# Patient Record
Sex: MALE | Race: BLACK OR AFRICAN AMERICAN | NOT HISPANIC OR LATINO | Employment: OTHER | URBAN - METROPOLITAN AREA
[De-identification: names, ages, dates, MRNs, and addresses within clinical notes are randomized per-mention and may not be internally consistent; named-entity substitution may affect disease eponyms.]

---

## 2021-04-19 ENCOUNTER — OFFICE VISIT (OUTPATIENT)
Dept: UROLOGY | Facility: CLINIC | Age: 71
End: 2021-04-19
Payer: OTHER GOVERNMENT

## 2021-04-19 ENCOUNTER — LAB VISIT (OUTPATIENT)
Dept: LAB | Facility: HOSPITAL | Age: 71
End: 2021-04-19
Attending: UROLOGY
Payer: OTHER GOVERNMENT

## 2021-04-19 VITALS
HEIGHT: 73 IN | SYSTOLIC BLOOD PRESSURE: 111 MMHG | DIASTOLIC BLOOD PRESSURE: 69 MMHG | WEIGHT: 237 LBS | HEART RATE: 84 BPM | BODY MASS INDEX: 31.41 KG/M2

## 2021-04-19 DIAGNOSIS — N40.1 BPH WITH URINARY OBSTRUCTION: Primary | ICD-10-CM

## 2021-04-19 DIAGNOSIS — N13.8 BPH WITH URINARY OBSTRUCTION: Primary | ICD-10-CM

## 2021-04-19 DIAGNOSIS — N40.1 BPH WITH URINARY OBSTRUCTION: ICD-10-CM

## 2021-04-19 DIAGNOSIS — N13.8 BPH WITH URINARY OBSTRUCTION: ICD-10-CM

## 2021-04-19 LAB — COMPLEXED PSA SERPL-MCNC: 2.6 NG/ML (ref 0–4)

## 2021-04-19 PROCEDURE — 36415 COLL VENOUS BLD VENIPUNCTURE: CPT | Performed by: UROLOGY

## 2021-04-19 PROCEDURE — 99202 OFFICE O/P NEW SF 15 MIN: CPT | Mod: PBBFAC | Performed by: UROLOGY

## 2021-04-19 PROCEDURE — 99999 PR PBB SHADOW E&M-NEW PATIENT-LVL II: ICD-10-PCS | Mod: PBBFAC,,, | Performed by: UROLOGY

## 2021-04-19 PROCEDURE — 84153 ASSAY OF PSA TOTAL: CPT | Performed by: UROLOGY

## 2021-04-19 PROCEDURE — 99204 PR OFFICE/OUTPT VISIT, NEW, LEVL IV, 45-59 MIN: ICD-10-PCS | Mod: S$PBB,,, | Performed by: UROLOGY

## 2021-04-19 PROCEDURE — 99999 PR PBB SHADOW E&M-NEW PATIENT-LVL II: CPT | Mod: PBBFAC,,, | Performed by: UROLOGY

## 2021-04-19 PROCEDURE — 99204 OFFICE O/P NEW MOD 45 MIN: CPT | Mod: S$PBB,,, | Performed by: UROLOGY

## 2021-04-19 RX ORDER — TAMSULOSIN HYDROCHLORIDE 0.4 MG/1
0.4 CAPSULE ORAL DAILY
Status: ON HOLD | COMMUNITY
End: 2023-05-04

## 2021-04-19 RX ORDER — CILOSTAZOL 50 MG/1
50 TABLET ORAL 2 TIMES DAILY
COMMUNITY
End: 2023-06-13

## 2021-04-19 RX ORDER — NAPROXEN SODIUM 220 MG/1
81 TABLET, FILM COATED ORAL DAILY
COMMUNITY

## 2021-04-19 RX ORDER — HYDROCHLOROTHIAZIDE 50 MG/1
50 TABLET ORAL DAILY
COMMUNITY
End: 2023-04-06

## 2021-04-19 RX ORDER — FINASTERIDE 5 MG/1
5 TABLET, FILM COATED ORAL DAILY
COMMUNITY

## 2021-04-19 RX ORDER — METFORMIN HYDROCHLORIDE 1000 MG/1
TABLET ORAL
COMMUNITY

## 2021-04-19 RX ORDER — ATORVASTATIN CALCIUM 40 MG/1
40 TABLET, FILM COATED ORAL DAILY
COMMUNITY

## 2021-04-26 ENCOUNTER — TELEPHONE (OUTPATIENT)
Dept: UROLOGY | Facility: CLINIC | Age: 71
End: 2021-04-26

## 2021-05-11 ENCOUNTER — HOSPITAL ENCOUNTER (OUTPATIENT)
Dept: RADIOLOGY | Facility: HOSPITAL | Age: 71
Discharge: HOME | End: 2021-05-11
Attending: UROLOGY
Payer: OTHER GOVERNMENT

## 2021-05-11 DIAGNOSIS — N40.1 BPH WITH URINARY OBSTRUCTION: ICD-10-CM

## 2021-05-11 DIAGNOSIS — N13.8 BPH WITH URINARY OBSTRUCTION: ICD-10-CM

## 2021-05-11 PROCEDURE — 76770 US RETROPERITONEAL COMPLETE: ICD-10-PCS | Mod: 26,,, | Performed by: RADIOLOGY

## 2021-05-11 PROCEDURE — 76770 US EXAM ABDO BACK WALL COMP: CPT | Mod: TC

## 2021-05-11 PROCEDURE — 76770 US EXAM ABDO BACK WALL COMP: CPT | Mod: 26,,, | Performed by: RADIOLOGY

## 2021-05-18 ENCOUNTER — PROCEDURE VISIT (OUTPATIENT)
Dept: UROLOGY | Facility: CLINIC | Age: 71
End: 2021-05-18
Payer: OTHER GOVERNMENT

## 2021-05-18 VITALS
RESPIRATION RATE: 17 BRPM | HEIGHT: 74 IN | TEMPERATURE: 98 F | SYSTOLIC BLOOD PRESSURE: 162 MMHG | BODY MASS INDEX: 31.67 KG/M2 | DIASTOLIC BLOOD PRESSURE: 94 MMHG | HEART RATE: 86 BPM | WEIGHT: 246.81 LBS

## 2021-05-18 DIAGNOSIS — N13.8 BPH WITH URINARY OBSTRUCTION: ICD-10-CM

## 2021-05-18 DIAGNOSIS — N40.1 BPH WITH URINARY OBSTRUCTION: ICD-10-CM

## 2021-05-18 PROCEDURE — 52000 CYSTOSCOPY: ICD-10-PCS | Mod: S$PBB,,, | Performed by: UROLOGY

## 2021-05-18 PROCEDURE — 76872 TRANSRECTAL ULTRASOUND: ICD-10-PCS | Mod: 26,S$PBB,, | Performed by: UROLOGY

## 2021-05-18 PROCEDURE — 52000 CYSTOURETHROSCOPY: CPT | Mod: PBBFAC | Performed by: UROLOGY

## 2021-05-18 PROCEDURE — 76872 US TRANSRECTAL: CPT | Mod: PBBFAC | Performed by: UROLOGY

## 2021-05-18 RX ORDER — LIDOCAINE HYDROCHLORIDE 20 MG/ML
JELLY TOPICAL
Status: COMPLETED | OUTPATIENT
Start: 2021-05-18 | End: 2021-05-18

## 2021-05-18 RX ADMIN — LIDOCAINE HYDROCHLORIDE: 20 JELLY TOPICAL at 12:05

## 2021-11-02 PROBLEM — M54.50 LOW BACK PAIN: Status: ACTIVE | Noted: 2021-11-02

## 2023-01-10 ENCOUNTER — OFFICE VISIT (OUTPATIENT)
Dept: URGENT CARE | Facility: CLINIC | Age: 73
End: 2023-01-10
Payer: MEDICARE

## 2023-01-10 VITALS
RESPIRATION RATE: 20 BRPM | SYSTOLIC BLOOD PRESSURE: 140 MMHG | TEMPERATURE: 99 F | BODY MASS INDEX: 31.57 KG/M2 | DIASTOLIC BLOOD PRESSURE: 87 MMHG | HEART RATE: 65 BPM | HEIGHT: 74 IN | WEIGHT: 246 LBS | OXYGEN SATURATION: 96 %

## 2023-01-10 DIAGNOSIS — G89.29 CHRONIC BILATERAL LOW BACK PAIN WITHOUT SCIATICA: Primary | ICD-10-CM

## 2023-01-10 DIAGNOSIS — M54.50 CHRONIC BILATERAL LOW BACK PAIN WITHOUT SCIATICA: Primary | ICD-10-CM

## 2023-01-10 PROCEDURE — 72100 XR LUMBAR SPINE 2 OR 3 VIEWS: ICD-10-PCS | Mod: S$GLB,,, | Performed by: RADIOLOGY

## 2023-01-10 PROCEDURE — 99203 OFFICE O/P NEW LOW 30 MIN: CPT | Mod: S$GLB,,,

## 2023-01-10 PROCEDURE — 99203 PR OFFICE/OUTPT VISIT, NEW, LEVL III, 30-44 MIN: ICD-10-PCS | Mod: S$GLB,,,

## 2023-01-10 PROCEDURE — 72100 X-RAY EXAM L-S SPINE 2/3 VWS: CPT | Mod: S$GLB,,, | Performed by: RADIOLOGY

## 2023-01-10 NOTE — PROGRESS NOTES
"Subjective:       Patient ID: Terrence Hackett is a 72 y.o. male.    Vitals:  height is 6' 2" (1.88 m) and weight is 111.6 kg (246 lb). His temperature is 98.8 °F (37.1 °C). His blood pressure is 140/87 (abnormal) and his pulse is 65. His respiration is 20 and oxygen saturation is 96%.     Chief Complaint: Back Pain    This is a 72 y.o. male who presents today with a chief complaint of lower back pain. Pain has been constant for about 5 years now. Pt has been to physical therapy before in the past and have had  treatment at other facilities, and he says that it did not work. [facility]. He states hat back pain has progressively worsened in the past 5 years. Pt has done heating pads and other pain relievers but still no relief. Pain radiates to the back of leg at times. He has been taking aspirin for the pain with no relief. Patient rates pain as a 8/10. Pain worse with laying down and when he first gets up and walks around. When patient walks longer pain gets better. Sometimes pain radiates down both/one leg. Denies urinary or bowel incontinence. Denies tingling or numbness in the legs.       Back Pain  This is a chronic problem. The current episode started more than 1 year ago. The problem occurs constantly. The problem is unchanged. The quality of the pain is described as aching. The pain does not radiate. The pain is at a severity of 8/10. The pain is moderate. The symptoms are aggravated by lying down and standing. Pertinent negatives include no bladder incontinence, bowel incontinence, headaches, numbness or tingling. He has tried muscle relaxant, ice, walking, bed rest and heat for the symptoms. The treatment provided no relief.     Constitution: Negative for sweating and fatigue.   HENT:  Negative for congestion.    Neck: Negative for neck pain and neck stiffness.   Eyes:  Negative for eye pain and eye redness.   Gastrointestinal:  Negative for constipation, diarrhea " and bowel incontinence.   Genitourinary:  Negative for urine decreased and bladder incontinence.   Musculoskeletal:  Positive for pain and back pain. Negative for trauma, joint pain, joint swelling, abnormal ROM of joint and pain with walking.   Skin:  Negative for hives.   Allergic/Immunologic: Negative for hives and itching.   Neurological:  Negative for headaches, disorientation, altered mental status and numbness.   Psychiatric/Behavioral:  Negative for altered mental status and disorientation.      Objective:      Physical Exam   Constitutional: He is oriented to person, place, and time. He appears well-developed. He is cooperative. No distress.   HENT:   Head: Normocephalic and atraumatic.   Nose: Nose normal.   Mouth/Throat: Oropharynx is clear and moist and mucous membranes are normal.   Eyes: Conjunctivae and lids are normal.   Neck: Trachea normal and phonation normal. Neck supple.   Cardiovascular: Normal rate, regular rhythm, normal heart sounds and normal pulses.   Pulmonary/Chest: Effort normal and breath sounds normal.   Abdominal: Normal appearance and bowel sounds are normal. He exhibits no mass. Soft.   Musculoskeletal:         General: No deformity.      Thoracic back: Normal.      Lumbar back: He exhibits tenderness and bony tenderness. He exhibits normal range of motion, no swelling, no edema, no deformity, no laceration and no spasm.        Back:       Comments: Full ROM of the spine without pain. There is tenderness to palpation over L3-L4 with no bony step offs palpated. Negative straight leg raise bilaterally. Normal gait.    Neurological: He is alert and oriented to person, place, and time. He has normal strength and normal reflexes. No sensory deficit.   Skin: Skin is warm, dry, intact and not diaphoretic.   Psychiatric: His speech is normal and behavior is normal. Judgment and thought content normal.   Nursing note and vitals reviewed.    XR LUMBAR SPINE 2 OR 3 VIEWS    Result Date:  1/10/2023  EXAMINATION: XR LUMBAR SPINE 2 OR 3 VIEWS CLINICAL HISTORY: Low back pain, unspecified TECHNIQUE: Three views of the lumbar spine. COMPARISON: None FINDINGS: There is multilevel lumbar spondylosis with intervertebral disc space narrowing and endplate degenerative changes.  There is multilevel facet arthropathy resulting in grade 1 anterolisthesis of L4 on L5. no fracture or bone destruction.  Soft tissue calcifications in the left gluteal region.  Extensive aortic atherosclerosis.  Symmetric degenerative changes of the sacroiliac joints.     Multilevel lumbar spondylosis.  No acute osseous abnormalities. Electronically signed by: Gatito Pardo MD Date:    01/10/2023 Time:    09:21     Assessment:       1. Chronic bilateral low back pain without sciatica          Plan:         Chronic bilateral low back pain without sciatica  -     Ambulatory referral/consult to [facility]  -     XR LUMBAR SPINE 2 OR 3 VIEWS; Future; Expected date: 01/10/2023                 Patient Instructions   A referral for Back and Spine specialist has been placed. Call [phone] to schedule an appointment.     - Rest.    - Drink plenty of fluids.    - Acetaminophen (tylenol) or Ibuprofen (advil,motrin) as directed as needed for fever/pain. Avoid tylenol if you have a history of liver disease. Do not take ibuprofen if you have a history of GI bleeding, kidney disease, or if you take blood thinners.   - Ibuprofen dosing for adults: 400 mg by mouth every 4-6 hours as needed. Max: 2400 mg/day; Info: use lowest effective dose, shortest effective treatment duration; give w/ food if GI upset occurs.  - Tylenol dosing for adults: [By mouth route, immediate-release form] Dose: 325-1000 mg by mouth every 4-6h as needed; Max: 1 g/4h and 4 g/day from all sources. [By mouth route, extended-release form] Dose: 650-1300 mg Extended Release by mouth every 8h as needed; Max: 4 g/day from all sources.     - You must understand that you  have received an Urgent Care treatment only and that you may be released before all of your medical problems are known or treated.   - You, the patient, will arrange for follow up care as instructed.   - If your condition worsens or fails to improve we recommend that you receive another evaluation at the ER immediately or contact your PCP to discuss your concerns or return here.   - Follow up with your PCP or specialty clinic as directed in the next 1-2 weeks if not improved or as needed.  You can call [phone] to schedule an appointment with the appropriate provider.    If your symptoms do not improve or worsen, go to the emergency room immediately.

## 2023-01-10 NOTE — PATIENT INSTRUCTIONS
A referral for Back and Spine specialist has been placed. Call [phone] to schedule an appointment.     - Rest.    - Drink plenty of fluids.    - Acetaminophen (tylenol) or Ibuprofen (advil,motrin) as directed as needed for fever/pain. Avoid tylenol if you have a history of liver disease. Do not take ibuprofen if you have a history of GI bleeding, kidney disease, or if you take blood thinners.   - Ibuprofen dosing for adults: 400 mg by mouth every 4-6 hours as needed. Max: 2400 mg/day; Info: use lowest effective dose, shortest effective treatment duration; give w/ food if GI upset occurs.  - Tylenol dosing for adults: [By mouth route, immediate-release form] Dose: 325-1000 mg by mouth every 4-6h as needed; Max: 1 g/4h and 4 g/day from all sources. [By mouth route, extended-release form] Dose: 650-1300 mg Extended Release by mouth every 8h as needed; Max: 4 g/day from all sources.     - You must understand that you have received an Urgent Care treatment only and that you may be released before all of your medical problems are known or treated.   - You, the patient, will arrange for follow up care as instructed.   - If your condition worsens or fails to improve we recommend that you receive another evaluation at the ER immediately or contact your PCP to discuss your concerns or return here.   - Follow up with your PCP or specialty clinic as directed in the next 1-2 weeks if not improved or as needed.  You can call [phone] to schedule an appointment with the appropriate provider.    If your symptoms do not improve or worsen, go to the emergency room immediately.

## 2023-02-27 ENCOUNTER — LAB VISIT (OUTPATIENT)
Dept: LAB | Facility: HOSPITAL | Age: 73
End: 2023-02-27
Payer: OTHER GOVERNMENT

## 2023-02-27 ENCOUNTER — OFFICE VISIT (OUTPATIENT)
Dept: ORTHOPEDICS | Facility: CLINIC | Age: 73
End: 2023-02-27
Payer: MEDICARE

## 2023-02-27 VITALS — HEIGHT: 74 IN | WEIGHT: 228.38 LBS | BODY MASS INDEX: 29.31 KG/M2

## 2023-02-27 DIAGNOSIS — M54.9 DORSALGIA, UNSPECIFIED: ICD-10-CM

## 2023-02-27 DIAGNOSIS — M54.9 DORSALGIA, UNSPECIFIED: Primary | ICD-10-CM

## 2023-02-27 LAB
ANION GAP SERPL CALC-SCNC: 7 MMOL/L (ref 8–16)
BUN SERPL-MCNC: 18 MG/DL (ref 8–23)
CALCIUM SERPL-MCNC: 9.4 MG/DL (ref 8.7–10.5)
CHLORIDE SERPL-SCNC: 105 MMOL/L (ref 95–110)
CO2 SERPL-SCNC: 30 MMOL/L (ref 23–29)
CREAT SERPL-MCNC: 1.3 MG/DL (ref 0.5–1.4)
EST. GFR  (NO RACE VARIABLE): 57.6 ML/MIN/1.73 M^2
GLUCOSE SERPL-MCNC: 78 MG/DL (ref 70–110)
POTASSIUM SERPL-SCNC: 4.1 MMOL/L (ref 3.5–5.1)
SODIUM SERPL-SCNC: 142 MMOL/L (ref 136–145)

## 2023-02-27 PROCEDURE — 99213 OFFICE O/P EST LOW 20 MIN: CPT | Mod: PBBFAC | Performed by: ORTHOPAEDIC SURGERY

## 2023-02-27 PROCEDURE — 36415 COLL VENOUS BLD VENIPUNCTURE: CPT | Performed by: ORTHOPAEDIC SURGERY

## 2023-02-27 PROCEDURE — 99214 OFFICE O/P EST MOD 30 MIN: CPT | Mod: S$PBB,,, | Performed by: ORTHOPAEDIC SURGERY

## 2023-02-27 PROCEDURE — 80048 BASIC METABOLIC PNL TOTAL CA: CPT | Performed by: ORTHOPAEDIC SURGERY

## 2023-02-27 PROCEDURE — 99214 PR OFFICE/OUTPT VISIT, EST, LEVL IV, 30-39 MIN: ICD-10-PCS | Mod: S$PBB,,, | Performed by: ORTHOPAEDIC SURGERY

## 2023-02-27 PROCEDURE — 99999 PR PBB SHADOW E&M-EST. PATIENT-LVL III: ICD-10-PCS | Mod: PBBFAC,,, | Performed by: ORTHOPAEDIC SURGERY

## 2023-02-27 PROCEDURE — 99999 PR PBB SHADOW E&M-EST. PATIENT-LVL III: CPT | Mod: PBBFAC,,, | Performed by: ORTHOPAEDIC SURGERY

## 2023-02-27 NOTE — PROGRESS NOTES
DATE: 2/27/2023  PATIENT: Terrence Hackett    Supervising Physician: Boyd Corbett M.D.    CHIEF COMPLAINT: low back pain    HISTORY:  Terrence Hackett is a 72 y.o. male [place]  here for initial evaluation of low back pain (Back - 5, Leg - 0).  The pain has been present for years, worsening over time. The patient describes the pain as aching.  The pain is worse with activity and improved by nothing. There is negative associated numbness and tingling. There is negative subjective weakness. Prior treatments have included PT (no relief), but no ESIs or surgery.    The patient denies myelopathic symptoms such as handwriting changes or difficulty with buttons/coins/keys. Denies perineal paresthesias, bowel/bladder dysfunction.    PAST MEDICAL/SURGICAL HISTORY:  No past medical history on file.  No past surgical history on file.    Medications:   Current Outpatient Medications on File Prior to Visit   Medication Sig Dispense Refill    aspirin 81 MG Chew Take 81 mg by mouth once daily.      atorvastatin (LIPITOR) 40 MG tablet Take 40 mg by mouth once daily.      cilostazoL (PLETAL) 50 MG Tab Take 50 mg by mouth 2 (two) times daily.      finasteride (PROSCAR) 5 mg tablet Take 5 mg by mouth once daily.      hydroCHLOROthiazide (HYDRODIURIL) 50 MG tablet Take 50 mg by mouth once daily.      metFORMIN (GLUCOPHAGE) 1000 MG tablet       tamsulosin (FLOMAX) 0.4 mg Cap Take 0.4 mg by mouth once daily.       No current facility-administered medications on file prior to visit.       Social History:   Social History     Socioeconomic History    Marital status:    Tobacco Use    Smoking status: Former    Smokeless tobacco: Never       REVIEW OF SYSTEMS:  Constitution: Negative. Negative for chills, fever and night sweats.   Cardiovascular: Negative for chest pain and syncope.   Respiratory: Negative for cough and shortness of breath.   Gastrointestinal: See HPI. Negative for nausea/vomiting. Negative for abdominal  pain.  Genitourinary: See HPI. Negative for discoloration or dysuria.  Skin: Negative for dry skin, itching and rash.   Hematologic/Lymphatic: Negative for bleeding problem. Does not bruise/bleed easily.   Musculoskeletal: Negative for falls and muscle weakness.   Neurological: See HPI. No seizures.   Endocrine: Negative for polydipsia, polyphagia and polyuria.   Allergic/Immunologic: Negative for hives and persistent infections.     EXAM:  There were no vitals taken for this visit.    General: The patient is a very pleasant 72 y.o. male in no apparent distress, the patient is oriented to person, place and time.  Psych: Normal mood and affect  HEENT: Vision grossly intact, hearing intact to the spoken word.  Lungs: Respirations unlabored.  Gait: Normal station and gait, no difficulty with toe or heel walk.   Skin: Dorsal lumbar skin negative for rashes, lesions, hairy patches and surgical scars. There is negative lumbar tenderness to palpation.  Range of motion: Lumbar range of motion is acceptable.  Spinal Balance: Global saggital and coronal spinal balance acceptable, not significant for scoliosis and kyphosis.  Musculoskeletal: No pain with the range of motion of the bilateral hips. No trochanteric tenderness to palpation.  Vascular: Bilateral lower extremities warm and well perfused, dorsalis pedis pulses 2+ bilaterally.  Neurological: Normal strength and tone in all major motor groups in the bilateral lower extremities. Normal sensation to light touch in the L2-S1 dermatomes bilaterally.  Deep tendon reflexes symmetric 2+ in the bilateral lower extremities.  Negative Babinski bilaterally. Straight leg raise negative bilaterally.    IMAGING:      Today I personally reviewed AP, Lat and Flex/Ex  upright L-spine films that demonstrate significant degenerative changes with trace anterolisthesis of L4 on L5.     There is no height or weight on file to calculate BMI.    No results found for:  HGBA1C        ASSESSMENT/PLAN:    There are no diagnoses linked to this encounter.    Today we discussed at length all of the different treatment options including anti-inflammatories, acetaminophen, rest, ice, heat, physical therapy including strengthening and stretching exercises, home exercises, ROM, aerobic conditioning, aqua therapy, other modalities including ultrasound, massage, and dry needling, epidural steroid injections and finally surgical intervention.      Pt presents with chronic low back pain. Failure of conservative rx. Will obtain lumbar MRI to further evaluate and call with results.

## 2023-03-09 ENCOUNTER — OFFICE VISIT (OUTPATIENT)
Dept: ORTHOPEDICS | Facility: CLINIC | Age: 73
End: 2023-03-09
Payer: MEDICARE

## 2023-03-09 DIAGNOSIS — M54.50 LOW BACK PAIN, UNSPECIFIED BACK PAIN LATERALITY, UNSPECIFIED CHRONICITY, UNSPECIFIED WHETHER SCIATICA PRESENT: Primary | ICD-10-CM

## 2023-03-09 PROCEDURE — 99441 PR PHYSICIAN TELEPHONE EVALUATION 5-10 MIN: CPT | Mod: 95,,, | Performed by: ORTHOPAEDIC SURGERY

## 2023-03-09 PROCEDURE — 99441 PR PHYSICIAN TELEPHONE EVALUATION 5-10 MIN: ICD-10-PCS | Mod: 95,,, | Performed by: ORTHOPAEDIC SURGERY

## 2023-03-09 NOTE — PROGRESS NOTES
Established Patient - Audio Only Telehealth Visit     The patient location is: home  The chief complaint leading to consultation is: MRI results  Visit type: Virtual visit with audio only (telephone)  Total time spent with patient: 10 min       The reason for the audio only service rather than synchronous audio and video virtual visit was related to technical difficulties or patient preference/necessity.     Each patient to whom I provide medical services by telemedicine is:  (1) informed of the relationship between the physician and patient and the respective role of any other health care provider with respect to management of the patient; and (2) notified that they may decline to receive medical services by telemedicine and may withdraw from such care at any time. Patient verbally consented to receive this service via voice-only telephone call.    DATE: 3/9/2023  PATIENT: Terrence Hackett    Attending Physician: Boyd Corbett MD    HISTORY:  Terrence Hackett is a 72 y.o. male who returns to me today for MRI results.  He was last seen by me 2/27/2023.  Today he is doing well but notes he continues to have low back pain.    The Patient denies myelopathic symptoms such as handwriting changes or difficulty with buttons/coins/keys. Denies perineal paresthesias, bowel/bladder dysfunction.    PMH/PSH/FamHx/SocHx:  Unchanged from prior visit    ROS:  REVIEW OF SYSTEMS:  Constitution: Negative. Negative for chills, fever and night sweats.   HENT: Negative for congestion and headaches.    Eyes: Negative for blurred vision, left vision loss and right vision loss.   Cardiovascular: Negative for chest pain and syncope.   Respiratory: Negative for cough and shortness of breath.    Endocrine: Negative for polydipsia, polyphagia and polyuria.   Hematologic/Lymphatic: Negative for bleeding problem. Does not bruise/bleed easily.   Skin: Negative for dry skin, itching and rash.   Musculoskeletal: Negative for falls and muscle weakness.    Gastrointestinal: Negative for abdominal pain and bowel incontinence.   Allergic/Immunologic: Negative for hives and persistent infections.  Genitourinary: Negative for urinary retention/incontinence and nocturia.   Neurological: negative for disturbances in coordination, no myelopathic symptoms such as handwriting changes or difficulty with buttons, coins, keys or small objects. No loss of balance and seizures.   Psychiatric/Behavioral: Negative for depression. The patient does not have insomnia.   Denies perineal paresthesias, bowel or bladder incontinence    EXAM:  There were no vitals taken for this visit.    My physical examination was notable for the following findings:     Musculoskeletal and neuro exam stable.      IMAGING:    Today I personally re- reviewed AP, Lat and Flex/Ex  upright L-spine that demonstrate grade I anterolisthesis of L4 on L5.    MRI lumbar demonstrates multilevel degenerative changes of the lumbar spine with moderate spinal canal stenosis and mild-to-moderate neural foraminal narrowing noted at L4-L5.    There is no height or weight on file to calculate BMI.    No results found for: HGBA1C      ASSESSMENT/PLAN:    There are no diagnoses linked to this encounter.    Today we discussed at length all of the different treatment options including anti-inflammatories, acetaminophen, rest, ice, heat, physical therapy including strengthening and stretching exercises, home exercises, ROM, aerobic conditioning, aqua therapy, other modalities including ultrasound, massage, and dry needling, epidural steroid injections and finally surgical intervention.      Pt presents with chronic low back pain. Failure of conservative rx. Will order IL L4-5 PHILIP with pain management. Pt will fu 2 weeks after injection.                        This service was not originating from a related E/M service provided within the previous 7 days nor will  to an E/M service or procedure within the next 24 hours or  my soonest available appointment.  Prevailing standard of care was able to be met in this audio-only visit.

## 2023-03-13 ENCOUNTER — TELEPHONE (OUTPATIENT)
Dept: ORTHOPEDICS | Facility: CLINIC | Age: 73
End: 2023-03-13
Payer: MEDICARE

## 2023-03-13 NOTE — TELEPHONE ENCOUNTER
Spoke to patient in regards to his injection. Stated to patient that the order was put in for the injection, and that the pain management office will be contacting him to that appointment schedule at the [place] location. Patient stated thank you. Thanks.

## 2023-04-06 ENCOUNTER — OFFICE VISIT (OUTPATIENT)
Dept: PAIN MEDICINE | Facility: CLINIC | Age: 73
End: 2023-04-06
Payer: MEDICARE

## 2023-04-06 VITALS
DIASTOLIC BLOOD PRESSURE: 100 MMHG | WEIGHT: 224 LBS | SYSTOLIC BLOOD PRESSURE: 169 MMHG | BODY MASS INDEX: 28.75 KG/M2 | HEART RATE: 72 BPM | HEIGHT: 74 IN

## 2023-04-06 DIAGNOSIS — M51.36 DDD (DEGENERATIVE DISC DISEASE), LUMBAR: Primary | ICD-10-CM

## 2023-04-06 DIAGNOSIS — M47.816 LUMBAR SPONDYLOSIS: ICD-10-CM

## 2023-04-06 PROCEDURE — 99204 OFFICE O/P NEW MOD 45 MIN: CPT | Mod: S$PBB,,, | Performed by: PAIN MEDICINE

## 2023-04-06 PROCEDURE — 99204 PR OFFICE/OUTPT VISIT, NEW, LEVL IV, 45-59 MIN: ICD-10-PCS | Mod: S$PBB,,, | Performed by: PAIN MEDICINE

## 2023-04-06 PROCEDURE — 99213 OFFICE O/P EST LOW 20 MIN: CPT | Mod: PBBFAC,PN | Performed by: PAIN MEDICINE

## 2023-04-06 PROCEDURE — 99999 PR PBB SHADOW E&M-EST. PATIENT-LVL III: CPT | Mod: PBBFAC,,, | Performed by: PAIN MEDICINE

## 2023-04-06 PROCEDURE — 99999 PR PBB SHADOW E&M-EST. PATIENT-LVL III: ICD-10-PCS | Mod: PBBFAC,,, | Performed by: PAIN MEDICINE

## 2023-04-06 RX ORDER — CLONAZEPAM 2 MG/1
2 TABLET ORAL
COMMUNITY
Start: 2022-12-13

## 2023-04-06 RX ORDER — TEMAZEPAM 7.5 MG/1
1 CAPSULE ORAL NIGHTLY PRN
COMMUNITY
Start: 2022-12-13

## 2023-04-06 RX ORDER — METHOCARBAMOL 500 MG/1
500 TABLET, FILM COATED ORAL 3 TIMES DAILY
COMMUNITY
Start: 2023-03-29

## 2023-04-06 RX ORDER — TRIAMTERENE AND HYDROCHLOROTHIAZIDE 75; 50 MG/1; MG/1
TABLET ORAL
COMMUNITY
Start: 2022-08-22

## 2023-04-06 NOTE — PROGRESS NOTES
Subjective:     Patient ID: Terrence Hackett is a 72 y.o. male    Chief Complaint: Low-back Pain      Referred by: Self, Aaareferral      HPI:    Initial Encounter (4/6/23):  Terrence Hackett is a 72 y.o. male who presents today with chronic bilateral low back pain. This pain has been present for many years, but has significantly worsened over the past 12 months. No specific inciting events or injuries noted. The pain is located in the bilateral lower lumbar parapsinal regions L>R. The pain radiates to the bilateral posterior thighs but does not cross the knees. He denies any associated numbness, tingling, weakness or b/b dysfunction. The pain is constant but fluctuates in intensity. It is typically worse in the morning when getting out of bed and towards the end of the day after being active. Pain is typically improved with activity. This pain is described in detail below.    Physical Therapy: Yes. Completed a full course of PT within the past year and continues regular HEP. Not effective.    Non-pharmacologic Treatment: Rest helps         TENS? No    Pain Medications:         Currently taking: Robaxin    Has tried in the past:  NSAIDs, tylenol    Has not tried: Opioids, TCAs, SNRIs, anticonvulsants, topical creams    Blood thinners: ASA 81 mg    Interventional Therapies: None    Relevant Surgeries: None    Affecting sleep? Yes    Affecting daily activities? yes    Depressive symptoms? no          SI/HI? No    Work status: Retired    Pain Scores:    Best:       3/10  Worst:     10/10  Usually:   6/10  Today:    6/10    Pain Disability Index  Family/Home Responsibilities:: 7  Recreation:: 5  Social Activity:: 5  Occupation:: 0  Sexual Behavior:: 4  Self Care:: 5  Life-Support Activities:: 5  Pain Disability Index (PDI): 31    Review of Systems   Constitutional:  Negative for activity change, appetite change, chills, fatigue, fever and unexpected weight change.   HENT:  Negative for hearing loss.    Eyes:  Negative for visual  "disturbance.   Respiratory:  Negative for chest tightness and shortness of breath.    Cardiovascular:  Negative for chest pain.   Gastrointestinal:  Negative for abdominal pain, constipation, diarrhea, nausea and vomiting.   Genitourinary:  Negative for difficulty urinating.   Musculoskeletal:  Positive for back pain, gait problem and myalgias. Negative for neck pain.   Skin:  Negative for rash.   Neurological:  Negative for dizziness, weakness, light-headedness, numbness and headaches.   Psychiatric/Behavioral:  Positive for sleep disturbance. Negative for hallucinations and suicidal ideas. The patient is not nervous/anxious.      History reviewed. No pertinent past medical history.    History reviewed. No pertinent surgical history.    Social History     Socioeconomic History    Marital status:    Tobacco Use    Smoking status: Former    Smokeless tobacco: Never       Review of patient's allergies indicates:   Allergen Reactions    Sulfamethoxazole-trimethoprim Rash and Swelling    Amoxicillin Blisters    Cephalexin        Current Outpatient Medications on File Prior to Visit   Medication Sig Dispense Refill    aspirin 81 MG Chew Take 81 mg by mouth once daily.      atorvastatin (LIPITOR) 40 MG tablet Take 40 mg by mouth once daily.      cilostazoL (PLETAL) 50 MG Tab Take 50 mg by mouth 2 (two) times daily.      clonazePAM (KLONOPIN) 2 MG Tab 2 mg.      finasteride (PROSCAR) 5 mg tablet Take 5 mg by mouth once daily.      metFORMIN (GLUCOPHAGE) 1000 MG tablet       methocarbamoL (ROBAXIN) 500 MG Tab Take 500 mg by mouth 3 (three) times daily.      tamsulosin (FLOMAX) 0.4 mg Cap Take 0.4 mg by mouth once daily.      temazepam (RESTORIL) 7.5 MG Cap Take 1 capsule by mouth nightly as needed.      triamterene-hydrochlorothiazide 75-50 mg (MAXZIDE) 75-50 mg per tablet TAKE ONE-HALF TABLET BY MOUTH ONCE DAILY AS A DIURETIC OR "WATER PILL      [DISCONTINUED] hydroCHLOROthiazide (HYDRODIURIL) 50 MG tablet Take 50 " "mg by mouth once daily.       No current facility-administered medications on file prior to visit.       Objective:      BP (!) 169/100   Pulse 72   Ht 6' 2" (1.88 m)   Wt 101.6 kg (224 lb)   BMI 28.76 kg/m²     Exam:  GEN:  Well developed, well nourished.  No acute distress. Normal pain behavior.  HEENT:  No trauma.  Mucous membranes moist.  Nares patent bilaterally.  PSYCH: Normal affect. Thought content appropriate.  CHEST:  Breathing symmetric.  No audible wheezing.  ABD: Soft, non-distended.  SKIN:  Warm, pink, dry.  No rash on exposed areas.    EXT:  No cyanosis, clubbing, or edema.  No color change or changes in nail or hair growth.  NEURO/MUSCULOSKELETAL:  Fully alert, oriented, and appropriate. Speech normal alis. No cranial nerve deficits.   Gait: Normal.  No trendelenburg sign bilaterally.   Motor Strength:  5/5 motor strength throughout lower extremities.   Sensory: No sensory deficit in the lower extremities.   Reflexes:  1+ and symmetric throughout.  Downgoing Babinski's bilaterally.  No clonus or spasticity.  L-Spine:  Limited ROM with pain on extension. Positive pain with axial/facet loading bilaterally.  Negative SLR bilaterally.    Positive TTP over bilateral lower lumbar paraspinals.        Imaging:      Narrative & Impression    EXAMINATION:  MRI LUMBAR SPINE WITHOUT CONTRAST     CLINICAL HISTORY:  Low back pain, symptoms persist with > 6wks conservative treatment; Dorsalgia, unspecified     TECHNIQUE:  Multiplanar, multisequence MR images were acquired from the thoracolumbar junction to the sacrum without contrast.     COMPARISON:  CT 01/10/2023     FINDINGS:  Alignment: Minimal retrolisthesis of L2-L3.     Vertebrae: Mild height loss of the T12 vertebral body with maintained marrow signal.  Degenerative endplate changes at L2-L5.  No acute fracture or marrow infiltrative process.     Discs: Moderate disc height loss at L2-L4.  No evidence for discitis.     Cord: Conus terminates at L1-L2 " and appears unremarkable.  Cauda equina appears unremarkable.     Degenerative findings:     T12-L1: No spinal canal stenosis or neural foraminal narrowing.     L1-L2: No spinal canal stenosis or neural foraminal narrowing.     L2-L3: Circumferential disc bulge results in mild bilateral neural foraminal narrowing.     L3-L4: Circumferential disc bulge and moderate facet arthropathy result in mild spinal canal stenosis and mild bilateral neural foraminal narrowing.     L4-L5: Circumferential disc bulge and severe facet arthropathy result in moderate spinal canal stenosis and mild left, moderate right neural foraminal narrowing.     L5-S1: Moderate facet arthropathy noted.  No spinal canal stenosis or neural foraminal narrowing.     Paraspinal muscles & soft tissues: Mild paraspinal muscle atrophy.     Impression:     1. Multilevel degenerative changes of the lumbar spine detailed above.  Moderate spinal canal stenosis and mild-to-moderate neural foraminal narrowing noted at L4-L5.        Electronically signed by: Jeff York MD  Date:                                            03/06/2023  Time:                                           16:58       Assessment:       Encounter Diagnoses   Name Primary?    DDD (degenerative disc disease), lumbar Yes    Lumbar spondylosis          Plan:       Terrence was seen today for low-back pain.    Diagnoses and all orders for this visit:    DDD (degenerative disc disease), lumbar    Lumbar spondylosis        Terrence Hackett is a 72 y.o. male with chronic bilateral low back and lower extremity pain. I suspect low back pain is axial and related to lower lumbar facet joints with somatic referred pain to the posterior thighs. Low suspicion for radicular pain at this time. MRI does show significant degeneration at the L4-5 level bilaterally with lesser degrees of degeneration at the L3-4 and L5-S1 levels. Multilevel DDD as well with some foraminal stenosis, but symptoms are less likely to  be related to this.     Pertinent imaging studies reviewed by me. Imaging results were discussed with patient.  Schedule for b/l L3, L4 and L5 MBBs x2. If diagnostic can proceed with RFAs.   RTC after procedures to discuss results.           This note was created by combination of typed  and M-Modal dictation. Transcription and phonetic errors may be present.  If there are any questions, please contact me.

## 2023-04-10 ENCOUNTER — TELEPHONE (OUTPATIENT)
Dept: PAIN MEDICINE | Facility: CLINIC | Age: 73
End: 2023-04-10
Payer: MEDICARE

## 2023-04-10 DIAGNOSIS — M47.816 LUMBAR SPONDYLOSIS: Primary | ICD-10-CM

## 2023-04-10 NOTE — TELEPHONE ENCOUNTER
----- Message from Brandon Vale sent at 4/10/2023  1:38 PM CDT -----  Regarding: call back  Pt call to schedule appt    Call

## 2023-04-10 NOTE — TELEPHONE ENCOUNTER
Spoke with patient. Discussed scheduling his 4 procedures with the provider. All dates selected and confirmed with patient. Follow up visit also scheduled.    Reviewed pre op instructions with patient:    Please leave jewelry and valuables at home or give them to your escort for safe keeping.  You will be required to have an adult to escort you after the procedure is over. Although we will not be sedating you for your procedure we ask for an escort for safety after the procedure.  Do not take over the counter blood thinning medications beginning 7 days before the procedure (aspirin, Motrin, ibuprofen, Advil, Aleve, naproxen). If you are taking prescribed thinners (Coumadin, warfarin, apixaban, Eliquis, Plavix, clopidogrel, Pletal, etc) we will obtain cardiac clearance from your cardiologist or provider that is monitoring your medications and levels to hold the medications if appropriate.  Cleanse your skin the evening before with an antibacterial soap (Dial or Hibiclens) and then repeat it again the morning of the procedure.  Do not apply lotions, creams, deodorants, perfumes, or colognes the day of the procedure.  You will be contacted the day before the procedure to be given the time to arrive the day of the procedure. If you are not contacted by the afternoon before the procedure you should contact [phone].  Nothing by mouth after midnight before the procedure.    Patient verbalized understanding of the instructions provided to them and clinic contact number was provided for any further questions they may have.

## 2023-05-03 ENCOUNTER — TELEPHONE (OUTPATIENT)
Dept: PAIN MEDICINE | Facility: CLINIC | Age: 73
End: 2023-05-03
Payer: MEDICARE

## 2023-05-03 NOTE — TELEPHONE ENCOUNTER
Pt arrived for treatment, however started feeling sick and went home.  Pt did no get treatment today   Spoke with patient.  Underwent bilateral L3, L4, L5 medial branch blocks #1/2 on 4/20/23.  He reports greater than 80% relief for over 5 hours the day of the procedure.  Pain has since returned to baseline.  Scheduled undergo bilateral L3, L4, L5 medial branch blocks #2/2 tomorrow.

## 2023-05-18 ENCOUNTER — TELEPHONE (OUTPATIENT)
Dept: PAIN MEDICINE | Facility: CLINIC | Age: 73
End: 2023-05-18
Payer: MEDICARE

## 2023-05-18 NOTE — TELEPHONE ENCOUNTER
Spoke with patient regarding efficacy of bilateral L3, L4, L5 medial branch blocks on 5/4/23.  Patient reports greater than 80% relief for roughly 5 hours following this procedure.  After this time, his pain gradually returned to baseline.  He denies any changes in the quality or location of his pain.  He denies any new or worsening symptoms.  He is scheduled undergo left L3, L4, L5 medial branch radiofrequency ablations later today.

## 2023-06-13 ENCOUNTER — OFFICE VISIT (OUTPATIENT)
Dept: PAIN MEDICINE | Facility: CLINIC | Age: 73
End: 2023-06-13
Payer: MEDICARE

## 2023-06-13 VITALS
DIASTOLIC BLOOD PRESSURE: 75 MMHG | SYSTOLIC BLOOD PRESSURE: 110 MMHG | WEIGHT: 219 LBS | BODY MASS INDEX: 28.11 KG/M2 | HEART RATE: 77 BPM | HEIGHT: 74 IN

## 2023-06-13 DIAGNOSIS — M47.816 LUMBAR SPONDYLOSIS: ICD-10-CM

## 2023-06-13 DIAGNOSIS — M51.36 DDD (DEGENERATIVE DISC DISEASE), LUMBAR: Primary | ICD-10-CM

## 2023-06-13 PROCEDURE — 99999 PR PBB SHADOW E&M-EST. PATIENT-LVL III: ICD-10-PCS | Mod: PBBFAC,,,

## 2023-06-13 PROCEDURE — 99213 OFFICE O/P EST LOW 20 MIN: CPT | Mod: S$PBB,,,

## 2023-06-13 PROCEDURE — 99999 PR PBB SHADOW E&M-EST. PATIENT-LVL III: CPT | Mod: PBBFAC,,,

## 2023-06-13 PROCEDURE — 99213 OFFICE O/P EST LOW 20 MIN: CPT | Mod: PBBFAC,PN

## 2023-06-13 PROCEDURE — 99213 PR OFFICE/OUTPT VISIT, EST, LEVL III, 20-29 MIN: ICD-10-PCS | Mod: S$PBB,,,

## 2023-06-13 RX ORDER — CILOSTAZOL 50 MG/1
100 TABLET ORAL
COMMUNITY
Start: 2022-10-28
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
## 2023-08-17 ENCOUNTER — TELEPHONE (OUTPATIENT)
Dept: PAIN MEDICINE | Facility: CLINIC | Age: 73
End: 2023-08-17
Payer: MEDICARE

## 2023-08-17 NOTE — TELEPHONE ENCOUNTER
----- Message from Allison Mancini sent at 2023  8:04 AM CDT -----  Regardin:45 appt running late  Contact: Karthik  Pt running late to appt: Pt got a flat on the way to the office         Provider: Sam   Caller: Karthik - scheduling   Appt time:7:45  ETA:8:15  Asking, will still be seen? Please ca

## 2023-08-22 ENCOUNTER — OFFICE VISIT (OUTPATIENT)
Dept: PAIN MEDICINE | Facility: CLINIC | Age: 73
End: 2023-08-22
Payer: MEDICARE

## 2023-08-22 VITALS
HEIGHT: 74 IN | SYSTOLIC BLOOD PRESSURE: 124 MMHG | HEART RATE: 70 BPM | WEIGHT: 219 LBS | DIASTOLIC BLOOD PRESSURE: 79 MMHG | BODY MASS INDEX: 28.11 KG/M2

## 2023-08-22 DIAGNOSIS — M51.36 DDD (DEGENERATIVE DISC DISEASE), LUMBAR: Primary | ICD-10-CM

## 2023-08-22 DIAGNOSIS — M54.16 LUMBAR RADICULOPATHY, CHRONIC: ICD-10-CM

## 2023-08-22 DIAGNOSIS — M47.816 LUMBAR SPONDYLOSIS: ICD-10-CM

## 2023-08-22 PROCEDURE — 99999 PR PBB SHADOW E&M-EST. PATIENT-LVL IV: CPT | Mod: PBBFAC,,,

## 2023-08-22 PROCEDURE — 99999 PR PBB SHADOW E&M-EST. PATIENT-LVL IV: ICD-10-PCS | Mod: PBBFAC,,,

## 2023-08-22 PROCEDURE — 99214 OFFICE O/P EST MOD 30 MIN: CPT | Mod: PBBFAC,PN

## 2023-08-22 PROCEDURE — 99214 PR OFFICE/OUTPT VISIT, EST, LEVL IV, 30-39 MIN: ICD-10-PCS | Mod: S$PBB,,,

## 2023-08-22 PROCEDURE — 99214 OFFICE O/P EST MOD 30 MIN: CPT | Mod: S$PBB,,,

## 2023-08-22 RX ORDER — GABAPENTIN 300 MG/1
600 CAPSULE ORAL 3 TIMES DAILY
Qty: 180 CAPSULE | Refills: 5 | Status: SHIPPED | OUTPATIENT
Start: 2023-08-22 | End: 2024-02-18
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
## 2023-08-22 NOTE — PATIENT INSTRUCTIONS
Gabapentin 300mg pills  Start taking one pill at night. (1 pill total per day.)  After three days, start taking one pill in the morning and one pill at night (2 pills total per day.)  After three days start taking one pill in the morning, one pill in the afternoon and one pill at night. (3 pills total per day.)  After three days start taking one pill in the morning, one pill in the afternoon and two pills at night. (4 pills total per day.)  After three days start taking two pills in the morning, one pill in the afternoon and two pills at night. (5 pills total per day.)  After three days start taking two pills in the morning, two pills in the afternoon and two pills at night. (6 pills total per day.)  Stay at this dose until next visit.   If experiencing any side effects take highest dose tolerated.

## 2025-01-20 PROBLEM — E11.9 TYPE 2 DIABETES MELLITUS WITHOUT COMPLICATION: Status: ACTIVE | Noted: 2025-01-20

## 2025-01-20 PROBLEM — F33.9 MAJOR DEPRESSIVE DISORDER, RECURRENT, UNSPECIFIED: Status: ACTIVE | Noted: 2025-01-20

## 2025-01-20 PROBLEM — I10 ESSENTIAL HYPERTENSION: Status: ACTIVE | Noted: 2025-01-20

## 2025-01-20 PROBLEM — E29.1 HYPOGONADISM IN MALE: Status: ACTIVE | Noted: 2025-01-20

## 2025-01-20 PROBLEM — E11.9 DIABETES MELLITUS: Status: ACTIVE | Noted: 2025-01-20

## 2025-01-20 PROBLEM — F43.12 CHRONIC POST-TRAUMATIC STRESS DISORDER (PTSD): Status: ACTIVE | Noted: 2025-01-20

## 2025-01-20 PROBLEM — Z86.79 HISTORY OF PAROXYSMAL SUPRAVENTRICULAR TACHYCARDIA: Status: ACTIVE | Noted: 2025-01-20

## 2025-01-20 PROBLEM — D12.6 BENIGN NEOPLASM OF COLON: Status: ACTIVE | Noted: 2025-01-20

## 2025-01-20 PROBLEM — I63.9 ACUTE CVA (CEREBROVASCULAR ACCIDENT): Status: ACTIVE | Noted: 2025-01-20

## 2025-01-20 PROBLEM — D64.9 ANEMIA: Status: ACTIVE | Noted: 2025-01-20

## 2025-01-20 PROBLEM — I73.9 PERIPHERAL VASCULAR DISEASE: Status: ACTIVE | Noted: 2025-01-20

## 2025-01-20 PROBLEM — F32.A DEPRESSION: Status: ACTIVE | Noted: 2025-01-20

## 2025-01-20 PROBLEM — E78.5 HYPERLIPIDEMIA: Status: ACTIVE | Noted: 2025-01-20

## 2025-01-20 PROBLEM — N40.0 BENIGN LOCALIZED HYPERPLASIA OF PROSTATE: Status: ACTIVE | Noted: 2025-01-20

## 2025-01-21 PROBLEM — I63.81 THALAMIC INFARCTION: Status: ACTIVE | Noted: 2025-01-20

## 2025-01-24 PROBLEM — Z86.73 CEREBRAL INFARCTION, REMOTE, RESOLVED: Status: ACTIVE | Noted: 2025-01-24

## 2025-01-24 PROBLEM — I63.81 THALAMIC INFARCTION: Status: RESOLVED | Noted: 2025-01-20 | Resolved: 2025-01-24

## 2025-01-27 ENCOUNTER — CLINICAL SUPPORT (OUTPATIENT)
Dept: CARDIOLOGY | Facility: HOSPITAL | Age: 75
End: 2025-01-27
Attending: FAMILY MEDICINE
Payer: MEDICARE

## 2025-01-27 DIAGNOSIS — I63.9 ACUTE CVA (CEREBROVASCULAR ACCIDENT): ICD-10-CM

## 2025-01-27 DIAGNOSIS — I63.81 THALAMIC INFARCTION: ICD-10-CM

## 2025-04-14 ENCOUNTER — EXTERNAL HOME HEALTH (OUTPATIENT)
Dept: HOME HEALTH SERVICES | Facility: HOSPITAL | Age: 75
End: 2025-04-14
Payer: MEDICARE